# Patient Record
(demographics unavailable — no encounter records)

---

## 2024-10-18 NOTE — HISTORY OF PRESENT ILLNESS
[FreeTextEntry1] : JUVE CARMONA is a 77 year old M  with a past medical history of history of hypertension, dyslipidemia, nonobstructive coronary disease by CTA (<50% 2011), atrial flutter s/p RFA and paroxysmal atrial fibrillation on anticoagulation, nephrolithiasis, gout, edema, CVI. S/P ILR 10/25/22 ILR transmitting remotely so far chronic AF, nocturnal varsha 40-50s c/w history, at times varsha 30s but asx  Previously presented with typical atrial flutter. Anticoagulation. Underwent radiofrequency ablation of typical counterclockwise atrial flutter as well as inducible typical AVNRT. There was concomitant atrial fibrillation and maintained on anticoagulation. He does not have any history of cerebrovascular events.  There is less peripheral edema after vasc procedurs. his functional status is limited. He has difficulty with his arthritis and uses a cane.  Denies exertional chest pain or discomfort. Denies unusual shortness of breath, orthopnea, weight gain, or LE edema. Denies palpitations, lightheadedness, dizziness, or syncope. Denies any unusual bleeding or black/tarry stools.  in the interim he has seen electrophysiology  plans to continue anticoagulation Varsha, pauses 5-6 seconds s/p micra pacemaker 7/16/24 off beta-blocker compliant with xarelto ILR still monitored remotely and AT/AF burden on website is 3.6% but more likely is chronic he feels well has no cardiac complaints since ppm  Echo 10/18/24 normal LV systolic function severe LAE, mild to mod MR, mild AI, asc ao 4.1cm ( a prior was 4.4)  EKG 1/12/24 atrial bradycardia 38 bpm IRBBB inferior infarct review prior there is no acute changes ABd sono 6/2023 abd ao ectasia Labs 3/11/23 hg 14, k 3.8, c r0.7, LDL 60 Cardiac catheterization 8/2022 demonstrated luminal irregularities. Carotid doppler 6/2022 mild nonobx atherosclerosis BL

## 2024-10-18 NOTE — ASSESSMENT
[FreeTextEntry1] : JUVE CARMONA is a 77 year old M who presents today Oct 18, 2024 with the above history and the following active issues:   Kurt, pauses s/p micra pacemaker 7/16/24 EP followup has been arranged to monitor device, will need micra PM checked in office q3 mo or as directed by EP ILR is continued to be followed on remote website given more sensitive arrhythmia detection Recent rates controlled Echo normal EF Labs requested and will call with results   Prior RFA of flutter and PSVT Concomitant atrial fibrillation. LAE. S/P ILR Off metoprolol  No abnormal bleeding on AC. Prior intol to Pradaxa. Cont xarelto 20mg daily. Reviewed bleeding precautions. Monitor CBC. Cont remote monitoring.  CAD, nonobstructive by CT. Reviewed cardiac catheterization 8/2022. no angina cont med rx - statin  HL Back on rosuva, monitor lipids   valvular heart disease, PH Low-sodium diet. Keep legs elevated.  Asc ao dilation. No AAA. BP well controlled. Surveillance monitoring advised.  F/U with our office in 6 months for routine cardiovascular care unless otherwise indicated.  Discussed red flag symptoms, which would warrant sooner or emergent medical evaluation. Any questions and concerns were addressed and resolved.    Sincerely,   Steph Sal, NOMAN-C Supervising MD: Dr. Joel Goldberg

## 2024-12-23 NOTE — PROCEDURE
[Pacemaker] : pacemaker [Threshold Testing Performed] : Threshold testing was performed [Lead Imp:  ___ohms] : lead impedance was [unfilled] ohms [Sensing Amplitude ___mv] : sensing amplitude was [unfilled] mv [___V @] : [unfilled] V [___ ms] : [unfilled] ms [None] : none [de-identified] : [employer] [de-identified] : Kandace [de-identified] : [id number] [de-identified] : 7/16/2024 [de-identified] : MARIANO [de-identified] : 70-105bpm [de-identified] : > 10 yrs [de-identified] :  Device alert "High %-only..." reviewed with MDT device rep Alberto and given chronic AF and prior to PPM HR 30-40s this is an acceptable finding and no changes advised.   Vpaced 59.5%  Recommend PPM interrogation in 3 months in office for MICRA - This should be done with EP/device rep to ensure no further optimization of Micra AV is needed.  In office q3 Mo thereafter.   His ILR will cont to be followed remotely interim d/t increase sensitivity of arrhythmia detection. Note AF is likely chronic and pt is maintained on AC.   See office visit today for full details.

## 2024-12-23 NOTE — HISTORY OF PRESENT ILLNESS
[FreeTextEntry1] : JUVE CARMONA is a 78 year old M  with a past medical history of history of hypertension, dyslipidemia, nonobstructive coronary disease by CTA (<50% 2011), atrial flutter s/p RFA and paroxysmal atrial fibrillation on anticoagulation, nephrolithiasis, gout, edema, CVI. S/P ILR 10/25/22 ILR transmitting remotely so far chronic AF, nocturnal varsha 40-50s c/w history, at times varsha 30s but asx  Previously presented with typical atrial flutter. Anticoagulation. Underwent radiofrequency ablation of typical counterclockwise atrial flutter as well as inducible typical AVNRT. There was concomitant atrial fibrillation and maintained on anticoagulation. He does not have any history of cerebrovascular events.  There is less peripheral edema after vasc procedurs. his functional status is limited. He has difficulty with his arthritis and uses a cane.  Denies exertional chest pain or discomfort. Denies unusual shortness of breath, orthopnea, weight gain, or LE edema. Denies palpitations, lightheadedness, dizziness, or syncope. Denies any unusual bleeding or black/tarry stools.  follows with electrophysiology  plans to continue anticoagulation Varsha, pauses 5-6 seconds s/p micra pacemaker 7/16/24 off beta-blocker compliant with xarelto ILR still monitored remotely no tachy events he feels well has no cardiac complaints since ppm  Last lab 6/2024 hg 13.1, k 4, cr 0.7  Echo 12/2024 EF 50-55% mild MR/AI, mild AS, PASP 52mmHg Echo 10/18/24 normal LV systolic function severe LAE, mild to mod MR, mild AI, asc ao 4.1cm ( a prior was 4.4)  EKG 1/12/24 atrial bradycardia 38 bpm IRBBB inferior infarct review prior there is no acute changes Abd sono 6/2023 abd ao ectasia Labs 3/11/23 hg 14, k 3.8, c r0.7, LDL 60 Cardiac catheterization 8/2022 demonstrated luminal irregularities. Carotid doppler 6/2022 mild nonobx atherosclerosis BL

## 2024-12-23 NOTE — ASSESSMENT
[FreeTextEntry1] : JUVE CARMONA is a 78 year old M who presents today Dec 23, 2024 with the above history and the following active issues:  Kurt, pauses s/p micra pacemaker 7/16/24 EP followup has been arranged to monitor device, will need micra PM checked in office q3 mo or as directed by EP ILR is continued to be followed on remote website given more sensitive arrhythmia detection Recent rates controlled Echo stable EF EP followup in 3 months, micra AV optimization if needed  Prior RFA of flutter and PSVT Concomitant atrial fibrillation. LAE. S/P ILR Off metoprolol  No abnormal bleeding on AC. Prior intol to Pradaxa. Cont xarelto 20mg daily. Reviewed bleeding precautions. Monitor CBC. Cont remote monitoring.  CAD, nonobstructive by CT. Reviewed cardiac catheterization 8/2022. no angina cont med rx - statin  HL Back on rosuva, monitor lipids  Labs requested and will call with results   valvular heart disease, PH Low-sodium diet. Keep legs elevated.  Asc ao dilation. No AAA. BP well controlled. Surveillance monitoring advised.  F/U with our office in 6 months for routine cardiovascular care unless otherwise indicated.  Discussed red flag symptoms, which would warrant sooner or emergent medical evaluation. Any questions and concerns were addressed and resolved.    Sincerely,   KARMA Isidro Patients history, testing, medications, and any relative changes to plan of care reviewed with supervising MD: Dr. Camille Mcneill

## 2025-03-26 NOTE — REVIEW OF SYSTEMS
[Feeling Fatigued] : not feeling fatigued [Blurry Vision] : no blurred vision [Sore Throat] : no sore throat [SOB] : no shortness of breath [Dyspnea on exertion] : not dyspnea during exertion [Chest Discomfort] : no chest discomfort [Palpitations] : no palpitations [Syncope] : no syncope [Cough] : no cough [Abdominal Pain] : no abdominal pain [Rash] : no rash [Dizziness] : no dizziness [Under Stress] : not under stress [Easy Bleeding] : no tendency for easy bleeding

## 2025-03-26 NOTE — HISTORY OF PRESENT ILLNESS
[FreeTextEntry1] : Patient is a 70 atrial man who is seen in follow-up evaluation regarding his Micra device as well as his implantable loop.  Overall he is feeling well has no symptoms.  He has no chest pain, shortness of breath, dizziness, lightness, syncope or presyncope.  He denies extra heartbeats or sensation of palpitations.  He has an implantable loop monitor that was implanted October 21, 2022.  Interrogation of the monitor today showed Early A-fib.  A-fib less than 1.5% and PVC less than 1%. He is currently on Xarelto     He is status post Micra AV placement July 16, 2024 for indication of second/third degree AV block.  He has been feeling well has no symptoms as relates to the patient.  Interrogation of the Micra device showed that he is 69% ventricular paced only.  I Previous history: Atrial flutter ablation more than 10 years ago at a hospital in [place].  He also reportedly had  AVNRT and A. fib but these were not ablated. He has a history of hypertension, dyslipidemia and previous nonobstructive coronaries.  Nuclear stress test from 6/2022 showed a small anterior defect suggestive of ischemia.  The patient underwent cardiac catheterization 8/25/2022 and showed nonobstructive coronaries.  He had an echocardiogram performed 6/13/2022 that showed EF of 60%, mild mitral regurgitation, severely dilated left atrium with left atrial volume index 56 cc per metered square.  The patient was previously on metoprolol and was discontinued September 2020 because of slow ventricular rates.  The patient has had leg ulcers bilaterally with left side being worse with weeping and he had a recent staph infection that has been treated.

## 2025-03-26 NOTE — DISCUSSION/SUMMARY
[FreeTextEntry1] : Patient is doing extremely without arrhythmia symptoms  Previously declined AF ablation Low PVC burden seems to be less than 1%.    The Micra AV device was interrogated and functioning normally.  It was further optimize for allow for more AV synchrony.  Continue with remote monitoring.  Continue anticoagulation with Xarelto.  Follow-up evaluation in a year.

## 2025-03-26 NOTE — PHYSICAL EXAM
[Well Nourished] : well nourished [Normal Venous Pressure] : normal venous pressure [Normal S1, S2] : normal S1, S2 [Clear Lung Fields] : clear lung fields [Non Tender] : non-tender [No Cyanosis] : no cyanosis [No Clubbing] : no clubbing [No Rash] : no rash [Moves all extremities] : moves all extremities [Alert and Oriented] : alert and oriented [de-identified] : Overall the patient appears well. [de-identified] : 2/6 systolic murmur left upper sternal border and right upper sternal border [de-identified] : Uses a cane.  Has arthritis in ankles left greater than right creates a limp. [de-identified] : Bilateral ankle edema that appears chronic left slightly greater than right. Walks with cane

## 2025-05-15 NOTE — HISTORY OF PRESENT ILLNESS
[FreeTextEntry1] : Eldon is a 78yoM with PMHx: HTN, HLD, nonobstructive CAD ([facility] 2022 minor luminal irregularities), AFlutter s/p RFA and pAFib on OAC, s/p MICRA (2024), dilated aorta, nephrolithiasis, gout, edema, abnormal LFTs.   ILR showed 23 beats NSVT 4/30/25, subsequently started on Toprol XL 25mg daily.  He had labs and nuclear stress test reported below.  No side effects to metoprolol.  Labs 5/9/25 K 4.5, Mg 1.7, Na 141, Cr 0.65, eGFR 94. Elevated AST 58, normal ALT, normal thyroid function. A1C 5.7%.  [facility] 5/13/25 SPECT: medium-sized, moderate to severe defect(s) in the anteroseptal wall that is fixed suggestive of an infarct. These results were reviewed with him in detail.   ILR/MICRA were interrogated to assess for further NSVT episodes. See DVC note.  There are no symptomatic palpitations, lightheadedness or dizziness. No syncope.  There is no exertional chest pain, pressure or discomfort, no unusual SOB/REYES. Clinically euvolemic.   Walks with a cane.

## 2025-05-15 NOTE — CARDIOLOGY SUMMARY
[de-identified] : 12/2024 LVEF 50-55%, mild MR, mild TR, mild AI, mild AS, mild LVH, PASP 52mmHg. Compared to the transthoracic echocardiogram performed on 10/18/2024, increase in PASP and RA size and possibly decreased LV systolic function.  [de-identified] : [facility] 2022 minor luminal irregularities.  [de-identified] : 2023 Abd US: Mild heterogeneous atherosclerosis noted throughout abdominal aorta. Abdominal aortic ectasia.

## 2025-05-15 NOTE — DISCUSSION/SUMMARY
[FreeTextEntry1] : Prior RFA of flutter and PSVT Concomitant atrial fibrillation. LAE. S/P ILR and Micra NSVT about 23 beats noted on 4/30/2025 on interrogation.  Patient was started on Toprol-XL 25 recently.  Tolerating.  No palpitations or lightheadedness or syncope.  Increase the dose to twice daily No abnormal bleeding on AC. Prior intol to Pradaxa. Cont xarelto 20mg daily. Reviewed bleeding  CAD, nonobstructive by CT and nuclear stress test showed moderate anteroseptal infarct but no ischemia. Reviewed cardiac catheterization 8/2022. Continue statin therapy. Labs showed magnesium of 1.7.  Supplement orally with mag oxide 250 mg daily.  He has already started doing this Low-sodium diet. Keep legs elevated. Asc ao dilation. No AAA. Continue CPAP mask use EP follow-up If palpitations or lightheadedness, call 911 and go to the ER stat.  Thank you for this referral and allowing me to participate in the care of this patient.  If I can be of any further help or  if you have any questions, please do not hesitate to contact me  Sincerely, Hussain Christian MD, FACC, SMITH

## 2025-05-15 NOTE — END OF VISIT
Patient contacted. Will follow up after speaking to staff. Augustina Antoine LPN     [Time Spent: ___ minutes] : I have spent [unfilled] minutes of time on the encounter which excludes teaching and separately reported services.

## 2025-05-28 NOTE — DISCUSSION/SUMMARY
[FreeTextEntry1] : Overall the patient seems to be doing very well with no issues as a relates to his Micra pacemaker.  He has no cardiac symptoms.Patient is doing extremely without arrhythmia symptoms  Previously declined AF ablation Low PVC burden seems to be less than 1%.    The Micra AV device was interrogated and functioning normally.  It was further optimize for allow for more AV synchrony.  Continue with remote monitoring.  Continue anticoagulation with Xarelto.  Follow-up evaluation in a year.

## 2025-05-28 NOTE — PHYSICAL EXAM
[Well Nourished] : well nourished [Normal Venous Pressure] : normal venous pressure [Normal S1, S2] : normal S1, S2 [Clear Lung Fields] : clear lung fields [Non Tender] : non-tender [No Cyanosis] : no cyanosis [No Clubbing] : no clubbing [No Rash] : no rash [Moves all extremities] : moves all extremities [Alert and Oriented] : alert and oriented [de-identified] : Overall the patient appears well. [de-identified] : 2/6 systolic murmur left upper sternal border and right upper sternal border [de-identified] : Uses a cane.  Has arthritis in ankles left greater than right creates a limp. [de-identified] : Bilateral ankle edema that appears chronic left slightly greater than right. Walks with cane

## 2025-05-28 NOTE — HISTORY OF PRESENT ILLNESS
[FreeTextEntry1] : Patient is a 77 yo  man who is seen in follow-up evaluation regarding his Micra device as well as his implantable loop.   Overall he is feeling well has no symptoms.  He has no chest pain, shortness of breath, dizziness, lightness, syncope or presyncope.  He denies extra heartbeats or sensation of palpitations. The patient had a CAT scan done and an meningioma was incidentally found he is undergoing further evaluation. He has an implantable loop monitor that was implanted October 21, 2022.  Interrogation of the monitor today showed .  A-fib less than 0.8% and PVC less than 1%.  He had pauses noted on the implantable loop monitor but these were based on under sensing. He is currently on Xarelto. The Micra pacemaker was interrogated and the remaining battery longevity is greater than 10 years.  Pacing sensing and lead impedances were normal.  He is ventricular paced only 67%.  Adjustments were made to enhance AV.  Programmed DDD mode with a low rate of 78 rate 105.    He is status post Micra AV placement July 16, 2024 for indication of second/third degree AV block.  He has been feeling well has no symptoms as relates to the patient.  Interrogation of the Micra device showed that he is 69% ventricular paced only.  I Previous history: Atrial flutter ablation more than 10 years ago at a hospital in [place].  He also reportedly had  AVNRT and A. fib but these were not ablated. He has a history of hypertension, dyslipidemia and previous nonobstructive coronaries.  Nuclear stress test from 6/2022 showed a small anterior defect suggestive of ischemia.  The patient underwent cardiac catheterization 8/25/2022 and showed nonobstructive coronaries.  He had an echocardiogram performed 6/13/2022 that showed EF of 60%, mild mitral regurgitation, severely dilated left atrium with left atrial volume index 56 cc per metered square.  The patient was previously on metoprolol and was discontinued September 2020 because of slow ventricular rates.  The patient has had leg ulcers bilaterally with left side being worse with weeping and he had a recent staph infection that has been treated.